# Patient Record
Sex: FEMALE | Race: ASIAN | NOT HISPANIC OR LATINO
[De-identification: names, ages, dates, MRNs, and addresses within clinical notes are randomized per-mention and may not be internally consistent; named-entity substitution may affect disease eponyms.]

---

## 2020-02-03 PROBLEM — Z00.00 ENCOUNTER FOR PREVENTIVE HEALTH EXAMINATION: Status: ACTIVE | Noted: 2020-02-03

## 2020-02-19 ENCOUNTER — APPOINTMENT (OUTPATIENT)
Dept: CARDIOLOGY | Facility: CLINIC | Age: 60
End: 2020-02-19
Payer: MEDICAID

## 2020-02-19 VITALS
RESPIRATION RATE: 16 BRPM | DIASTOLIC BLOOD PRESSURE: 74 MMHG | HEART RATE: 81 BPM | OXYGEN SATURATION: 97 % | TEMPERATURE: 97.9 F | BODY MASS INDEX: 21.34 KG/M2 | WEIGHT: 125 LBS | SYSTOLIC BLOOD PRESSURE: 110 MMHG | HEIGHT: 64 IN

## 2020-02-19 PROCEDURE — 99204 OFFICE O/P NEW MOD 45 MIN: CPT | Mod: 25

## 2020-02-19 PROCEDURE — 93015 CV STRESS TEST SUPVJ I&R: CPT

## 2020-02-19 PROCEDURE — ZZZZZ: CPT

## 2020-02-19 PROCEDURE — 93306 TTE W/DOPPLER COMPLETE: CPT

## 2020-02-20 NOTE — PHYSICAL EXAM
[General Appearance - Well Developed] : well developed [Normal Appearance] : normal appearance [No Deformities] : no deformities [Well Groomed] : well groomed [General Appearance - Well Nourished] : well nourished [Normal Conjunctiva] : the conjunctiva exhibited no abnormalities [General Appearance - In No Acute Distress] : no acute distress [Eyelids - No Xanthelasma] : the eyelids demonstrated no xanthelasmas [Normal Oral Mucosa] : normal oral mucosa [No Oral Cyanosis] : no oral cyanosis [No Oral Pallor] : no oral pallor [Normal Jugular Venous A Waves Present] : normal jugular venous A waves present [Normal Jugular Venous V Waves Present] : normal jugular venous V waves present [No Jugular Venous Lock A Waves] : no jugular venous lock A waves [Heart Sounds] : normal S1 and S2 [Murmurs] : no murmurs present [Heart Rate And Rhythm] : heart rate and rhythm were normal [Exaggerated Use Of Accessory Muscles For Inspiration] : no accessory muscle use [Respiration, Rhythm And Depth] : normal respiratory rhythm and effort [Abdomen Soft] : soft [Abdomen Tenderness] : non-tender [Auscultation Breath Sounds / Voice Sounds] : lungs were clear to auscultation bilaterally [Abdomen Mass (___ Cm)] : no abdominal mass palpated [Abnormal Walk] : normal gait [Gait - Sufficient For Exercise Testing] : the gait was sufficient for exercise testing [Petechial Hemorrhages (___cm)] : no petechial hemorrhages [Nail Clubbing] : no clubbing of the fingernails [Cyanosis, Localized] : no localized cyanosis [Oriented To Time, Place, And Person] : oriented to person, place, and time [] : no ischemic changes [Affect] : the affect was normal [Mood] : the mood was normal [No Anxiety] : not feeling anxious

## 2020-02-23 NOTE — REASON FOR VISIT
[FreeTextEntry1] : 59 year-old female with HLD presents for evaluation of abnormal ECG. Patient reports having CP 2 months ago that is a sharp pain that lasted days, not from exertion, not related to meals. Patient feels that it may be due to stomach pain. Patient denies SOB. Patient denies palpitations. Patient had fainted once in her 30s during shower. Mother  suddenly at age 78. Patient had surgery on her R clavicle when she took a fall 4 years ago. I advised patient to undergo an echocardiogram and a treadmill stress test.

## 2022-01-20 ENCOUNTER — APPOINTMENT (OUTPATIENT)
Dept: CARDIOLOGY | Facility: CLINIC | Age: 62
End: 2022-01-20
Payer: MEDICAID

## 2022-01-20 VITALS
SYSTOLIC BLOOD PRESSURE: 134 MMHG | RESPIRATION RATE: 16 BRPM | OXYGEN SATURATION: 97 % | TEMPERATURE: 97.6 F | DIASTOLIC BLOOD PRESSURE: 80 MMHG | BODY MASS INDEX: 23.34 KG/M2 | WEIGHT: 136 LBS | HEART RATE: 85 BPM

## 2022-01-20 PROCEDURE — 99214 OFFICE O/P EST MOD 30 MIN: CPT

## 2022-01-20 PROCEDURE — 93306 TTE W/DOPPLER COMPLETE: CPT

## 2022-01-20 NOTE — REASON FOR VISIT
[Symptom and Test Evaluation] : symptom and test evaluation [FreeTextEntry1] : 59 year-old female with HLD presents for followup.  \par \par Patient was last seen on 2/19/20 for evaluation of abnormal ECG.  Patient underwent an echocardiogram and it showed normal LV function without significant valvular pathology. Patient underwent a treadmill stress test and completed 7 minutes of Deny protocol.  There were upsloping ST depressions on ECG but no symptoms.  Following treadmill stress, there was no echocardiographic evidence of ischemia. \par \par

## 2022-01-20 NOTE — HISTORY OF PRESENT ILLNESS
[FreeTextEntry1] : 20 - Patient reports having CP 2 months ago that is a sharp pain that lasted days, not from exertion, not related to meals. Patient feels that it may be due to stomach pain. Patient denies SOB. Patient denies palpitations. Patient had fainted once in her 30s during shower. Mother  suddenly at age 78. Patient had surgery on her R clavicle when she took a fall 4 years ago. I advised patient to undergo an echocardiogram and a treadmill stress test.

## 2022-01-20 NOTE — PHYSICAL EXAM
[General Appearance - Well Developed] : well developed [Normal Appearance] : normal appearance [Well Groomed] : well groomed [General Appearance - Well Nourished] : well nourished [No Deformities] : no deformities [General Appearance - In No Acute Distress] : no acute distress [Normal Conjunctiva] : the conjunctiva exhibited no abnormalities [Eyelids - No Xanthelasma] : the eyelids demonstrated no xanthelasmas [Normal Oral Mucosa] : normal oral mucosa [No Oral Pallor] : no oral pallor [No Oral Cyanosis] : no oral cyanosis [Normal Jugular Venous A Waves Present] : normal jugular venous A waves present [Normal Jugular Venous V Waves Present] : normal jugular venous V waves present [No Jugular Venous Lock A Waves] : no jugular venous lock A waves [Heart Rate And Rhythm] : heart rate and rhythm were normal [Heart Sounds] : normal S1 and S2 [Murmurs] : no murmurs present [Respiration, Rhythm And Depth] : normal respiratory rhythm and effort [Exaggerated Use Of Accessory Muscles For Inspiration] : no accessory muscle use [Auscultation Breath Sounds / Voice Sounds] : lungs were clear to auscultation bilaterally [Abdomen Soft] : soft [Abdomen Tenderness] : non-tender [Abdomen Mass (___ Cm)] : no abdominal mass palpated [Abnormal Walk] : normal gait [Gait - Sufficient For Exercise Testing] : the gait was sufficient for exercise testing [Nail Clubbing] : no clubbing of the fingernails [Cyanosis, Localized] : no localized cyanosis [Petechial Hemorrhages (___cm)] : no petechial hemorrhages [] : no ischemic changes [Oriented To Time, Place, And Person] : oriented to person, place, and time [Affect] : the affect was normal [Mood] : the mood was normal [No Anxiety] : not feeling anxious

## 2022-01-24 ENCOUNTER — NON-APPOINTMENT (OUTPATIENT)
Age: 62
End: 2022-01-24

## 2022-01-24 LAB — SARS-COV-2 N GENE NPH QL NAA+PROBE: NOT DETECTED

## 2023-04-24 ENCOUNTER — APPOINTMENT (OUTPATIENT)
Dept: CARDIOLOGY | Facility: CLINIC | Age: 63
End: 2023-04-24
Payer: COMMERCIAL

## 2023-04-24 VITALS
WEIGHT: 130 LBS | BODY MASS INDEX: 22.31 KG/M2 | SYSTOLIC BLOOD PRESSURE: 121 MMHG | HEART RATE: 75 BPM | DIASTOLIC BLOOD PRESSURE: 79 MMHG | TEMPERATURE: 96.6 F | OXYGEN SATURATION: 100 % | RESPIRATION RATE: 16 BRPM

## 2023-04-24 DIAGNOSIS — R06.02 SHORTNESS OF BREATH: ICD-10-CM

## 2023-04-24 DIAGNOSIS — R07.89 OTHER CHEST PAIN: ICD-10-CM

## 2023-04-24 PROCEDURE — ZZZZZ: CPT

## 2023-04-24 PROCEDURE — 93015 CV STRESS TEST SUPVJ I&R: CPT

## 2023-04-24 PROCEDURE — 93306 TTE W/DOPPLER COMPLETE: CPT

## 2023-04-24 PROCEDURE — 99214 OFFICE O/P EST MOD 30 MIN: CPT | Mod: 25

## 2023-04-30 PROBLEM — R07.89 CHEST DISCOMFORT: Status: ACTIVE | Noted: 2020-02-19

## 2024-07-23 ENCOUNTER — NON-APPOINTMENT (OUTPATIENT)
Age: 64
End: 2024-07-23

## 2024-07-24 ENCOUNTER — APPOINTMENT (OUTPATIENT)
Dept: CARDIOLOGY | Facility: CLINIC | Age: 64
End: 2024-07-24
Payer: MEDICAID

## 2024-07-24 VITALS
WEIGHT: 130 LBS | DIASTOLIC BLOOD PRESSURE: 85 MMHG | OXYGEN SATURATION: 100 % | HEART RATE: 74 BPM | RESPIRATION RATE: 18 BRPM | BODY MASS INDEX: 22.31 KG/M2 | SYSTOLIC BLOOD PRESSURE: 136 MMHG

## 2024-07-24 DIAGNOSIS — R07.89 OTHER CHEST PAIN: ICD-10-CM

## 2024-07-24 DIAGNOSIS — I71.9 AORTIC ANEURYSM OF UNSPECIFIED SITE, W/OUT RUPTURE: ICD-10-CM

## 2024-07-24 DIAGNOSIS — R06.02 SHORTNESS OF BREATH: ICD-10-CM

## 2024-07-24 PROCEDURE — ZZZZZ: CPT

## 2024-07-24 PROCEDURE — 93000 ELECTROCARDIOGRAM COMPLETE: CPT | Mod: 59

## 2024-07-24 PROCEDURE — 93306 TTE W/DOPPLER COMPLETE: CPT

## 2024-07-24 PROCEDURE — 99214 OFFICE O/P EST MOD 30 MIN: CPT | Mod: 25

## 2024-07-24 NOTE — HISTORY OF PRESENT ILLNESS
[FreeTextEntry1] : 24 - Patient reports upper CP and SOB for the past 3 months.  Patient reports dizziness and sleepiness.  /85 HR 74.  Exam unremarkable.  ECG showed ( ).  I advised patient to undergo an echocardiogram and a treadmill stress test.  I advised patient to undergo a carotid Doppler.  I advised patient to undergo a CTA of coronary arteries.  I will obtain insurance authorization.   23 - Patient reports SOB.  Patient reports hoarseness.  Patient denies CP.  Patient reports lower CP.  I advised patient to undergo an echocardiogram and a treadmill stress test.  Patient underwent an echocardiogram and it showed normal LV function without significant valvular pathology. Patient underwent a treadmill stress test and completed 4.5 minutes of Deny protocol.  There were upsloping ST depressions on ECG but no symptoms.  Following treadmill stress, there was no echocardiographic evidence of ischemia.   22 - Pt got Covid booster on 22.  On - she developed fever, sore throat, and SOB.  CXR on 22 showed increased interstitial markings.  I advised patient to undergo an echocardiogram.  Patient underwent an echocardiogram and it showed normal LV function, mild dilatation of the ascending aorta (4.0 cm), without significant valvular pathology.   20 - Patient reports having CP 2 months ago that is a sharp pain that lasted days, not from exertion, not related to meals. Patient feels that it may be due to stomach pain. Patient denies SOB. Patient denies palpitations. Patient had fainted once in her 30s during shower. Mother  suddenly at age 78. Patient had surgery on her R clavicle when she took a fall 4 years ago. I advised patient to undergo an echocardiogram and a treadmill stress test.  Patient underwent an echocardiogram and it showed normal LV function without significant valvular pathology. Patient underwent a treadmill stress test and completed 7 minutes of Deny protocol.  There were upsloping ST depressions on ECG but no symptoms.  Following treadmill stress, there was no echocardiographic evidence of ischemia.

## 2024-07-24 NOTE — REASON FOR VISIT
[FreeTextEntry1] : 64 year-old female with HLD presents for followup.    Patient was last seen on 4/24/23 - Patient reports SOB.  Patient reports hoarseness.  Patient denies CP.  Patient reports lower CP.  I advised patient to undergo an echocardiogram and a treadmill stress test.  Patient underwent an echocardiogram and it showed normal LV function without significant valvular pathology. Patient underwent a treadmill stress test and completed 4.5 minutes of Deny protocol.  There were upsloping ST depressions on ECG but no symptoms.  Following treadmill stress, there was no echocardiographic evidence of ischemia.   Patient underwent an echocardiogram 1/20/22 and it showed normal LV function, mild dilatation of the ascending aorta (4.0 cm), without significant valvular pathology.   Patient underwent an echocardiogram 2/19/20 and it showed normal LV function without significant valvular pathology.   Patient underwent a treadmill stress test 2/19/20 and completed 7 minutes of Deny protocol.  There were upsloping ST depressions on ECG but no symptoms.  Following treadmill stress, there was no echocardiographic evidence of ischemia.

## 2024-07-29 ENCOUNTER — NON-APPOINTMENT (OUTPATIENT)
Age: 64
End: 2024-07-29

## 2024-08-07 ENCOUNTER — APPOINTMENT (OUTPATIENT)
Dept: CARDIOLOGY | Facility: CLINIC | Age: 64
End: 2024-08-07

## 2024-08-07 PROCEDURE — 99213 OFFICE O/P EST LOW 20 MIN: CPT

## 2024-08-07 NOTE — HISTORY OF PRESENT ILLNESS
[FreeTextEntry1] : 24 - Patient reports facial swelling and LE edema.  No weight gain noted.  No LE edema noted.  Awaiting CTA of the coronary arteries.  24 - Patient reports upper CP and SOB for the past 3 months.  Patient reports dizziness and sleepiness.  /85 HR 74.  Exam unremarkable.   I advised patient to undergo an echocardiogram and a treadmill stress test.  Patient underwent an echocardiogram and it showed normal LV function, mild dilatation of the ascending aorta (3.9 cm), without significant valvular pathology.   Patient underwent a treadmill stress test and completed 5.5 minutes of Deny protocol.  There were upsloping ST depressions on ECG but no symptoms.  Following treadmill stress, there was no echocardiographic evidence of ischemia.  I advised patient to undergo a carotid Doppler.  I advised patient to undergo a CTA of coronary arteries. Patient underwent a carotid Doppler and it showed no significant stenosis.   23 - Patient reports SOB.  Patient reports hoarseness.  Patient denies CP.  Patient reports lower CP.  I advised patient to undergo an echocardiogram and a treadmill stress test.  Patient underwent an echocardiogram and it showed normal LV function without significant valvular pathology. Patient underwent a treadmill stress test and completed 4.5 minutes of Deny protocol.  There were upsloping ST depressions on ECG but no symptoms.  Following treadmill stress, there was no echocardiographic evidence of ischemia.   22 - Pt got Covid booster on 22.  On - she developed fever, sore throat, and SOB.  CXR on 22 showed increased interstitial markings.  I advised patient to undergo an echocardiogram.  Patient underwent an echocardiogram and it showed normal LV function, mild dilatation of the ascending aorta (4.0 cm), without significant valvular pathology.   20 - Patient reports having CP 2 months ago that is a sharp pain that lasted days, not from exertion, not related to meals. Patient feels that it may be due to stomach pain. Patient denies SOB. Patient denies palpitations. Patient had fainted once in her 30s during shower. Mother  suddenly at age 78. Patient had surgery on her R clavicle when she took a fall 4 years ago. I advised patient to undergo an echocardiogram and a treadmill stress test.  Patient underwent an echocardiogram and it showed normal LV function without significant valvular pathology. Patient underwent a treadmill stress test and completed 7 minutes of Deny protocol.  There were upsloping ST depressions on ECG but no symptoms.  Following treadmill stress, there was no echocardiographic evidence of ischemia.

## 2024-08-07 NOTE — REASON FOR VISIT
[FreeTextEntry1] : 64 year-old female with thyroid CA s/p resection, HLD, presents for followup.    Patient was last seen on 7/24/24 - Patient reports upper CP and SOB for the past 3 months.  Patient reports dizziness and sleepiness.  /85 HR 74.  Exam unremarkable.   I advised patient to undergo an echocardiogram and a treadmill stress test.  Patient underwent an echocardiogram and it showed normal LV function, mild dilatation of the ascending aorta (3.9 cm), without significant valvular pathology.   Patient underwent a treadmill stress test and completed 5.5 minutes of Deny protocol.  There were upsloping ST depressions on ECG but no symptoms.  Following treadmill stress, there was no echocardiographic evidence of ischemia.  I advised patient to undergo a carotid Doppler.  I advised patient to undergo a CTA of coronary arteries. Patient underwent a carotid Doppler and it showed no significant stenosis.   Patient underwent an echocardiogram 4/24/23 and it showed normal LV function without significant valvular pathology.   Patient underwent a treadmill stress test 4/24/23 and completed 4.5 minutes of Deny protocol.  There were upsloping ST depressions on ECG but no symptoms.  Following treadmill stress, there was no echocardiographic evidence of ischemia.   Patient underwent an echocardiogram 1/20/22 and it showed normal LV function, mild dilatation of the ascending aorta (4.0 cm), without significant valvular pathology.   Patient underwent an echocardiogram 2/19/20 and it showed normal LV function without significant valvular pathology.   Patient underwent a treadmill stress test 2/19/20 and completed 7 minutes of Deny protocol.  There were upsloping ST depressions on ECG but no symptoms.  Following treadmill stress, there was no echocardiographic evidence of ischemia.

## 2024-08-22 DIAGNOSIS — I25.10 ATHEROSCLEROTIC HEART DISEASE OF NATIVE CORONARY ARTERY W/OUT ANGINA PECTORIS: ICD-10-CM

## 2024-08-22 RX ORDER — ATORVASTATIN CALCIUM 20 MG/1
20 TABLET, FILM COATED ORAL DAILY
Qty: 90 | Refills: 1 | Status: ACTIVE | COMMUNITY
Start: 1900-01-01 | End: 1900-01-01